# Patient Record
Sex: FEMALE | Race: WHITE | Employment: FULL TIME | URBAN - METROPOLITAN AREA
[De-identification: names, ages, dates, MRNs, and addresses within clinical notes are randomized per-mention and may not be internally consistent; named-entity substitution may affect disease eponyms.]

---

## 2017-05-26 ENCOUNTER — HOSPITAL ENCOUNTER (OUTPATIENT)
Dept: GENERAL RADIOLOGY | Age: 57
Discharge: HOME | End: 2017-05-26
Attending: ORTHOPAEDIC SURGERY
Payer: COMMERCIAL

## 2017-05-26 ENCOUNTER — HOSPITAL ENCOUNTER (OUTPATIENT)
Dept: CT IMAGING | Age: 57
Discharge: HOME | End: 2017-05-26
Attending: ORTHOPAEDIC SURGERY
Payer: COMMERCIAL

## 2017-05-26 VITALS — RESPIRATION RATE: 20 BRPM

## 2017-05-26 DIAGNOSIS — M54.9 BACK PAIN: ICD-10-CM

## 2017-05-26 DIAGNOSIS — M51.36 DEGENERATIVE LUMBAR DISC: ICD-10-CM

## 2017-05-26 DIAGNOSIS — M54.30 SCIATICA: ICD-10-CM

## 2017-05-26 PROCEDURE — 74011636320 HC RX REV CODE- 636/320: Performed by: RADIOLOGY

## 2017-05-26 PROCEDURE — 62304 MYELOGRAPHY LUMBAR INJECTION: CPT

## 2017-05-26 PROCEDURE — 72132 CT LUMBAR SPINE W/DYE: CPT

## 2017-05-26 RX ORDER — LIDOCAINE HYDROCHLORIDE 10 MG/ML
INJECTION, SOLUTION EPIDURAL; INFILTRATION; INTRACAUDAL; PERINEURAL
Status: DISPENSED
Start: 2017-05-26 | End: 2017-05-26

## 2017-05-26 RX ADMIN — IOHEXOL 20 ML: 180 INJECTION INTRAVENOUS at 09:16

## 2017-05-26 NOTE — IP AVS SNAPSHOT
Current Discharge Medication List  
  
ASK your doctor about these medications Dose & Instructions Dispensing Information Comments Morning Noon Evening Bedtime BENADRYL ALLERGY 25 mg tablet Generic drug:  diphenhydrAMINE Your last dose was: Your next dose is:    
   
   
 Dose:  25 mg Take 25 mg by mouth nightly as needed. Refills:  0 CLARITIN 10 mg tablet Generic drug:  loratadine Your last dose was: Your next dose is:    
   
   
 Dose:  10 mg Take 10 mg by mouth daily as needed. Refills:  0 HYDROcodone-acetaminophen 5-325 mg per tablet Commonly known as:  Thi Sue Your last dose was: Your next dose is: Take  by mouth every eight (8) hours as needed. Refills:  0  
     
   
   
   
  
 oxyCODONE-acetaminophen 5-325 mg per tablet Commonly known as:  PERCOCET Your last dose was: Your next dose is:    
   
   
 Dose:  1-2 Tab Take 1-2 Tabs by mouth every four (4) hours as needed for Pain. Quantity:  50 Tab Refills:  0  
     
   
   
   
  
 TYLENOL 325 mg tablet Generic drug:  acetaminophen Your last dose was: Your next dose is: Take  by mouth every four (4) hours as needed. Refills:  0

## 2017-05-26 NOTE — DISCHARGE INSTRUCTIONS
[address]  Special Procedures/Radiology Department      Lumbar Puncture Discharge Instructions    Remain flat in bed or on the sofa for the next 24 hours. Drink plenty of water over the next 24 to 48 hours. Avoid caffeine products. Resume your previous diet and follow the medication reconciliation form. You make take Tylenol, as directed on the label, for pain or headache. Do not take aspirin or ibuprofen (Motrin or Advil) for the next 48 hours as it may cause you to bleed. Restrict your activity for the next 24 to 48 hours. No strenuous work or activities for the next 24 to 48 hours. Follow up with your referring physician for test results. If you have any questions or concerns, call [phone] and ask to speak to the nurse on-call.

## 2017-05-26 NOTE — PROGRESS NOTES
CT advised sending images to disc to be processed at present time - pt adjusting self in bed and reports no pain or discomfort at bandaid site - pt moving all extremities and denies any new discomfort or pain. Discharge instructions reviewed and pt states understanding of all reviewed w/activity instructions and states will call dr. Flora Bowling if any sympton occur.

## 2017-05-26 NOTE — IP AVS SNAPSHOT
[address] [facility] 
[phone] Patient: Zakiya Schneider MRN: [id number] Jay Decree You are allergic to the following Allergen Reactions Aspirin Other (comments) WORSENS TINNITUS Codeine Other (comments) FAINTED Recent Documentation OB Status Smoking Status Postmenopausal Never Smoker Emergency Contacts Name Discharge Info Relation Home Work Mobile Kadeem Gtz DISCHARGE CAREGIVER [3] Spouse [3] [phone] About your hospitalization You were admitted on:  May 26, 2017 You last received care in the:  [facility] RADIOLOGY You were discharged on:  May 26, 2017 Unit phone number:  [phone] Why you were hospitalized Your primary diagnosis was:  Not on File Providers Seen During Your Hospitalizations Provider Role Specialty Primary office phone Brenda Meyer MD Attending Provider Orthopedic Surgery [phone] Your Primary Care Physician (PCP) Primary Care Physician Office Phone Office Fax Lexa Tavera [phone] Follow-up Information None Current Discharge Medication List  
  
ASK your doctor about these medications Dose & Instructions Dispensing Information Comments Morning Noon Evening Bedtime BENADRYL ALLERGY 25 mg tablet Generic drug:  diphenhydrAMINE Your last dose was: Your next dose is:    
   
   
 Dose:  25 mg Take 25 mg by mouth nightly as needed. Refills:  0 CLARITIN 10 mg tablet Generic drug:  loratadine Your last dose was: Your next dose is:    
   
   
 Dose:  10 mg Take 10 mg by mouth daily as needed. Refills:  0 HYDROcodone-acetaminophen 5-325 mg per tablet Commonly known as:  Milinda Copier Your last dose was: Your next dose is: Take  by mouth every eight (8) hours as needed. Refills:  0  
     
   
   
   
  
 oxyCODONE-acetaminophen 5-325 mg per tablet Commonly known as:  PERCOCET Your last dose was: Your next dose is:    
   
   
 Dose:  1-2 Tab Take 1-2 Tabs by mouth every four (4) hours as needed for Pain. Quantity:  50 Tab Refills:  0  
     
   
   
   
  
 TYLENOL 325 mg tablet Generic drug:  acetaminophen Your last dose was: Your next dose is: Take  by mouth every four (4) hours as needed. Refills:  0 Discharge Instructions Richard Rebolledo [facility] Special Procedures/Radiology Department Lumbar Puncture Discharge Instructions Remain flat in bed or on the sofa for the next 24 hours. Drink plenty of water over the next 24 to 48 hours. Avoid caffeine products. Resume your previous diet and follow the medication reconciliation form. You make take Tylenol, as directed on the label, for pain or headache. Do not take aspirin or ibuprofen (Motrin or Advil) for the next 48 hours as it may cause you to bleed. Restrict your activity for the next 24 to 48 hours. No strenuous work or activities for the next 24 to 48 hours. Follow up with your referring physician for test results. If you have any questions or concerns, call [phone] and ask to speak to the nurse on-call. Discharge Orders None Introducing [facility]! Richard Rebolledo introduces [employer] patient portal. Now you can access parts of your medical record, email your doctor's office, and request medication refills online. 1. In your internet browser, go to [email or web address] 2. Click on the First Time User? Click Here link in the Sign In box. You will see the New Member Sign Up page. 3. Enter your [employer] Access Code exactly as it appears below.  You will not need to use this code after youve completed the sign-up process. If you do not sign up before the expiration date, you must request a new code. · [employer] Access Code: [email or web address] Expires: 8/24/2017  8:07 AM 
 
4. Enter the last four digits of your Social Security Number (xxxx) and Date of Birth (mm/dd/yyyy) as indicated and click Submit. You will be taken to the next sign-up page. 5. Create a [employer] ID. This will be your [employer] login ID and cannot be changed, so think of one that is secure and easy to remember. 6. Create a [employer] password. You can change your password at any time. 7. Enter your Password Reset Question and Answer. This can be used at a later time if you forget your password. 8. Enter your e-mail address. You will receive e-mail notification when new information is available in [address] 9. Click Sign Up. You can now view and download portions of your medical record. 10. Click the Download Summary menu link to download a portable copy of your medical information. If you have questions, please visit the Frequently Asked Questions section of the [employer] website. Remember, [employer] is NOT to be used for urgent needs. For medical emergencies, dial 911. Now available from your iPhone and Android! General Information Please provide this summary of care documentation to your next provider. Patient Signature:  ____________________________________________________________ Date:  ____________________________________________________________  
  
Alva Sierra Provider Signature:  ____________________________________________________________ Date:  ____________________________________________________________

## 2017-10-18 ENCOUNTER — HOSPITAL ENCOUNTER (OUTPATIENT)
Dept: MRI IMAGING | Age: 57
Discharge: HOME | End: 2017-10-18
Attending: ORTHOPAEDIC SURGERY
Payer: COMMERCIAL

## 2017-10-18 DIAGNOSIS — M47.816 LUMBAR SPONDYLOSIS: ICD-10-CM

## 2017-10-18 DIAGNOSIS — Z98.1 S/P LUMBAR FUSION: ICD-10-CM

## 2017-10-18 PROCEDURE — 72148 MRI LUMBAR SPINE W/O DYE: CPT

## 2018-08-29 ENCOUNTER — HOSPITAL ENCOUNTER (OUTPATIENT)
Dept: ULTRASOUND IMAGING | Age: 58
Discharge: HOME | End: 2018-08-29
Attending: ORTHOPAEDIC SURGERY
Payer: COMMERCIAL

## 2018-08-29 DIAGNOSIS — M76.829 POSTERIOR TIBIAL TENDON DYSFUNCTION: ICD-10-CM

## 2018-08-29 PROCEDURE — 76882 US LMTD JT/FCL EVL NVASC XTR: CPT
